# Patient Record
Sex: FEMALE | Race: WHITE | NOT HISPANIC OR LATINO | URBAN - METROPOLITAN AREA
[De-identification: names, ages, dates, MRNs, and addresses within clinical notes are randomized per-mention and may not be internally consistent; named-entity substitution may affect disease eponyms.]

---

## 2024-03-26 ENCOUNTER — APPOINTMENT (RX ONLY)
Dept: URBAN - METROPOLITAN AREA CLINIC 130 | Facility: CLINIC | Age: 19
Setting detail: DERMATOLOGY
End: 2024-03-26

## 2024-03-26 DIAGNOSIS — L20.9 ATOPIC DERMATITIS, UNSPECIFIED: ICD-10-CM

## 2024-03-26 PROBLEM — L30.9 DERMATITIS, UNSPECIFIED: Status: ACTIVE | Noted: 2024-03-26

## 2024-03-26 PROCEDURE — ? ADDITIONAL NOTES

## 2024-03-26 PROCEDURE — 99203 OFFICE O/P NEW LOW 30 MIN: CPT

## 2024-03-26 PROCEDURE — ? DEFER

## 2024-03-26 NOTE — PROCEDURE: DEFER
Procedure To Be Performed At Next Visit: Biopsy by punch method
Detail Level: Detailed
Reason To Defer Override: patient to call for emergency add on with active flare
Size Of Lesion In Cm (Optional): 0
Introduction Text (Please End With A Colon): The following procedure was deferred:

## 2024-03-26 NOTE — PROCEDURE: ADDITIONAL NOTES
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 DDx also includes EN, other neutrophilic dermatosis, LCV. We discussed biopsy of active lesion is recommended for diagnostic support.\\n\\nPatient has history of ulcerative colitis s/p total colectomy in 2020, not currently on immunomodulator therapy.\\nGI physician Dr. Sam Head ([facility] Tel: [phone])\\n\\nOk to add on as emergency add-on for biopsy of new active nodule
Render Risk Assessment In Note?: no
Detail Level: Simple